# Patient Record
(demographics unavailable — no encounter records)

---

## 2024-10-17 NOTE — HISTORY OF PRESENT ILLNESS
[9] : 9 [5] : 5 [Dull/Aching] : dull/aching [Constant] : constant [Household chores] : household chores [Leisure] : leisure [Work] : work [Social interactions] : social interactions [Full time] : Work status: full time [de-identified] : 9/12/24: here for left knee Visco-3 #3.   10/17/2024: Patient here for follow up on the left knee pain. Patient states he is still having pain and discomfort in the left knee.  [] : no [FreeTextEntry1] : Left Knee [de-identified] : Movement

## 2024-10-17 NOTE — ASSESSMENT
[FreeTextEntry1] : Persistent pain despite viscosupplementation.  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  I do not have an explanation for the persistent symptoms despite conservative measures.  Advised MRI to rule out meniscal injury, subchondral fracture, or loose body in the knee.  Follow-up to review

## 2024-10-17 NOTE — IMAGING
[de-identified] : Left Knee Exam: Inspection: No effusion, no warmth, no ecchymosis Palpation: Medial joint line tenderness to palpation, negative Abilio Range of motion: 0-130 with mild anterior crepitus Strength: 5/5 quadriceps and hamstring strength Stability: Ligamentously stable Motor and sensory intact distally Gait: Non antalgic gait

## 2025-04-10 NOTE — HISTORY OF PRESENT ILLNESS
[9] : 9 [5] : 5 [Dull/Aching] : dull/aching [Sharp] : sharp [de-identified] : 3/27/25; Patient is here for a follow up of the left knee. pain has been the same overall.   4/10/25: Patient is here for Visco-3 injection #1 [] : Post Surgical Visit: no [FreeTextEntry1] : left knee  [de-identified] : none

## 2025-04-10 NOTE — PROCEDURE
[FreeTextEntry3] : Large joint injection was performed of the left knee.  The indication for this procedure was pain, inflammation and x-ray evidence of Osteoarthritis on this or prior visit. The site was prepped with alcohol, betadine, ethyl chloride sprayed topically and sterile technique used.  An injection of visco-3  2ml, series #1 was used.   Patient was advised to call if redness, pain or fever occur, apply ice for 15 minutes out of every hour for the next 12-24 hours as tolerated and patient was advised to rest the joint(s) for 2 days. Patient has tried OTC's including aspirin, Ibuprofen, Aleve, etc or prescription NSAIDS, and/or exercises at home and/or physical therapy without satisfactory response, patient had decreased mobility in the joint and the risks benefits, and alternatives have been discussed, and verbal consent was obtained.

## 2025-04-17 NOTE — IMAGING
[de-identified] : Knee Exam: Inspection: No effusion, no warmth, no ecchymosis Palpation: Medial joint line tenderness to palpation, negative Abilio Range of motion: 0-130 with mild anterior crepitus Strength: 5/5 quadriceps and hamstring strength Stability: Ligamentously stable Motor and sensory intact distally Gait: Non antalgic gait

## 2025-04-17 NOTE — ASSESSMENT
[FreeTextEntry1] : Visco 3 #2 series left knee today tolerated procedure well, post procedure precautions discussed use of cryotherapy discussed rtc in 1 week to continue series

## 2025-04-17 NOTE — PROCEDURE
[FreeTextEntry3] : Large joint injection was performed of the left knee.  The indication for this procedure was pain, inflammation and x-ray evidence of Osteoarthritis on this or prior visit. The site was prepped with alcohol, betadine, ethyl chloride sprayed topically and sterile technique used.  An injection of visco-3  2ml, series #2 was used.   Patient was advised to call if redness, pain or fever occur, apply ice for 15 minutes out of every hour for the next 12-24 hours as tolerated and patient was advised to rest the joint(s) for 2 days. Patient has tried OTC's including aspirin, Ibuprofen, Aleve, etc or prescription NSAIDS, and/or exercises at home and/or physical therapy without satisfactory response, patient had decreased mobility in the joint and the risks benefits, and alternatives have been discussed, and verbal consent was obtained.

## 2025-04-24 NOTE — PROCEDURE
Daily Note/Re-Evaluation     Today's date: 6/3/2022  Patient name: Blade Burkett  : 1972  MRN: [id number]  Referring provider: Anali Valladares DO  Dx:   Encounter Diagnosis     ICD-10-CM    1  Pain in left foot  [id number]        Start Time: 745  Stop Time: 830  Total time in clinic (min): 45 minutes  ASSESSMENT:   Blade Burkett is a 52 y o  male who presents with signs and symptoms consistent of chronic left foot pain  Patient demonstrated improvements with ankle ROM and tissue extensibility in the plantar surface of the foot  Patient has had minimal changes in the numbness symptoms  Have been completed neural tension testing with sciatic nerve bias  However, patient has no reproducible sign with the numbness on the lateral foot and 5th digit  Patient's symptoms do seem to be multifactorial in nature and seem to have some peripheral involvement  Patient has met some of his short term goals and is progressing appropriately toward long term goals  Due to these impairments, Patient has difficulty performing a/iadls and recreational activities  Patient would benefit from skilled physical therapy to address the impairments, improve their level of function, and to improve their overall quality of life  Impairments:    restricted ROM    decreased strength   pain with function   activity intolerance   weight bearing intolerance     Prognosis:  Good  Positive and negative prognostic indicator(s):  pain >3 months    Goals:    Short Term Goals: to be achieved by 4 weeks  1) Patient to be independent with basic HEP  PROGRESSING  2) Decrease pain to 1/10 at its worst  PROGRESSING  3) Increase ankle DF ROM by 5-10 degrees MET   4) Increase LE strength by 1/2 MMT grade in all deficient planes  MET    Long Term Goals: to be achieved by discharge  1) FOTO equal to or greater than indicating improvements with overall function    PROGRESSING  2) Patient will demonstrate little to no pain in the morning in order to improve quality of life  PROGRESSING  3) Patient will be able to return to running with little to no limitations in order to participate in recreational activities  PROGRESSING  4) Patient will be competent in comprehensive HEP in order to manage symptoms on his own  PROGRESSING    Planned interventions:  home exercise program, patient education, manual therapy, graded activity, flexibility, functional range of motion exercises, strengthening, balance and weight bearing training, gait training and low level laser with IASTM    Duration in visits:  3-6  Frequency: 1-2 visits per week  Duration in weeks:  3    History of Current Injury: Patient notes that the ankle and foot do feel a little looser but he is still having numbness in the lateral foot and pinky toe  Pain location: plantar plantar surface near the pinky toes  Pain descriptors:  Tightness; numbness in the pinky toe   Pain at Currently:  0/10  Pain at Best: 0/10  Pain at Worst: 2-3/10      Aggravating factors: standing, walking, running (on the treadmill)   Easing factors: n/a      Patient goals: Patient reports goals for physical therapy would be to decrease tightness in the foot and get back to running  Objective     Neurological Testing     Sensation     Ankle/Foot   Left Ankle/Foot   Intact: light touch    Right Ankle/Foot   Intact: light touch     Active Range of Motion   Left Ankle/Foot   Dorsiflexion (ke): 95 degrees   Dorsiflexion (kf): 98 degrees   Plantar flexion: 40 degrees   Inversion: WFL  Eversion: WFL  Great toe flexion: WFL  Great toe extension: WFL    Right Ankle/Foot   Normal active range of motion    Additional Active Range of Motion Details  Decreased flexion of the 4th and 5th digit  Joint Play   Left Ankle/Foot  Hypomobile in the talocrural joint, subtalar joint and midfoot       Strength/Myotome Testing     Left Hip   Planes of Motion   Flexion: 5  Extension: 4  Abduction: 4+    Isolated Muscles   Gluteus nicholas: 4  Gluteus medius: 4+    Right Hip   Planes of Motion   Flexion: 5  Extension: 4  Abduction: 4+    Isolated Muscles   Gluteus maximums: 4  Gluteus medius: 4+    Left Ankle/Foot   Dorsiflexion: 5  Plantar flexion: 4+  Inversion: 5  Eversion: 5  Great toe extension: 4+    Tests   Left Ankle/Foot   Positive for forefoot valgus  Negative for Tinel's sign (tarsal tunnel)  Some neural tension sciatic nerve      Ambulation     Quality of Movement During Gait     Foot Alignment    Foot Alignment (Left): Positive cavus and rigid hindfoot          Precautions: n/a       Manuals 5/3 5/6 5/26 6/1 6/3        Talocrural mobs    SK          Subtalar mobs              Mid foot mobs   ACL SK ACL ACL        IASTM to the lateral plantar foot   ACL SK ACL ACL        Neuro Re-Ed             SLS              [place]  NV 3x full cup  3x full cup  3x full cup          Short foot NV 5" hold 20x 5" hold 20x           Nerve glide in supine 90-90             Standing clamshells SLS 2x10 ea  2x10 ea  2x10 ea 2x10 ea GTB 2x10 ea GTB        Toe walking with weight  10# ea hand     3x full //bar length (1lap down and back)  10# ea hand     3x full //bar length (1lap down and back)  10# ea hand     3x full //bar length (1lap down and back)  12# ea hand     3x full //bar length (1lap down and back)  12# ea hand     3x full //bar length (1lap down and back)                      Ther Ex             gastroc stretch on SB 3x30"  3x30"  3x30" 3x30" Off step 2x30" single leg         gastroc and toe stretch with towel             DF wall lunge stretch              Slider lunges              Tennis ball press downs for posterior tib              Toe stretch on stretch board  3x30"  3x30"          X-walks              Heel raises  2 up and 1 down   2x10  2 up and 1 down   2x10  2 up and 1 down   2x10  2 up and 1 down   2x10          Soleus heel raises in standing                                        Bike---> [place] upright bike lvl 3 5 min upright bike lvl 3 5 min  upright bike lvl 3 5 min  upright bike lvl 3 5 min  WW 5 min         Ther Activity             Wall squats                           Gait Training                                       Modalities [FreeTextEntry3] : Large joint injection was performed of the left knee.  The indication for this procedure was pain, inflammation and x-ray evidence of Osteoarthritis on this or prior visit. The site was prepped with alcohol, betadine, ethyl chloride sprayed topically and sterile technique used.  An injection of visco-3  2ml, series #3 was used.   Patient was advised to call if redness, pain or fever occur, apply ice for 15 minutes out of every hour for the next 12-24 hours as tolerated and patient was advised to rest the joint(s) for 2 days. Patient has tried OTC's including aspirin, Ibuprofen, Aleve, etc or prescription NSAIDS, and/or exercises at home and/or physical therapy without satisfactory response, patient had decreased mobility in the joint and the risks benefits, and alternatives have been discussed, and verbal consent was obtained.

## 2025-04-24 NOTE — PROCEDURE
[FreeTextEntry3] : Large joint injection was performed of the left knee.  The indication for this procedure was pain, inflammation and x-ray evidence of Osteoarthritis on this or prior visit. The site was prepped with alcohol, betadine, ethyl chloride sprayed topically and sterile technique used.  An injection of visco-3  2ml, series #3 was used.   Patient was advised to call if redness, pain or fever occur, apply ice for 15 minutes out of every hour for the next 12-24 hours as tolerated and patient was advised to rest the joint(s) for 2 days. Patient has tried OTC's including aspirin, Ibuprofen, Aleve, etc or prescription NSAIDS, and/or exercises at home and/or physical therapy without satisfactory response, patient had decreased mobility in the joint and the risks benefits, and alternatives have been discussed, and verbal consent was obtained.

## 2025-04-24 NOTE — ASSESSMENT
[FreeTextEntry1] : Visco 3 #3 series left knee today tolerated procedure well, post procedure precautions discussed use of cryotherapy discussed rtc prn, discussed timing of repeat injections   I am working today under the supervision of Dr. Rashid and I am following the plan of care of Dr. Rashid as described by him on this date. Progress note completed by Claudia Kowalski PA-C under the supervision of Dr. Rashid.

## 2025-04-24 NOTE — IMAGING
[de-identified] : Knee Exam: Inspection: No effusion, no warmth, no ecchymosis Palpation: Medial joint line tenderness to palpation, negative Abilio Range of motion: 0-130 with mild anterior crepitus Strength: 5/5 quadriceps and hamstring strength Stability: Ligamentously stable Motor and sensory intact distally Gait: Non antalgic gait

## 2025-04-24 NOTE — IMAGING
[de-identified] : Knee Exam: Inspection: No effusion, no warmth, no ecchymosis Palpation: Medial joint line tenderness to palpation, negative Abilio Range of motion: 0-130 with mild anterior crepitus Strength: 5/5 quadriceps and hamstring strength Stability: Ligamentously stable Motor and sensory intact distally Gait: Non antalgic gait